# Patient Record
Sex: FEMALE | Race: WHITE | URBAN - METROPOLITAN AREA
[De-identification: names, ages, dates, MRNs, and addresses within clinical notes are randomized per-mention and may not be internally consistent; named-entity substitution may affect disease eponyms.]

---

## 2024-11-19 ENCOUNTER — HOSPITAL ENCOUNTER (EMERGENCY)
Facility: HOSPITAL | Age: 34
Discharge: HOME | End: 2024-11-19
Attending: EMERGENCY MEDICINE
Payer: COMMERCIAL

## 2024-11-19 ENCOUNTER — HOSPITAL ENCOUNTER (OUTPATIENT)
Facility: HOSPITAL | Age: 34
Discharge: ANOTHER HOSPITAL | End: 2024-11-19
Attending: OBSTETRICS & GYNECOLOGY | Admitting: OBSTETRICS & GYNECOLOGY
Payer: COMMERCIAL

## 2024-11-19 ENCOUNTER — HOSPITAL ENCOUNTER (EMERGENCY)
Facility: HOSPITAL | Age: 34
Discharge: STILL ADMITTED OR ADMINISTRATIVE | End: 2024-11-19
Payer: COMMERCIAL

## 2024-11-19 VITALS
SYSTOLIC BLOOD PRESSURE: 103 MMHG | HEART RATE: 102 BPM | BODY MASS INDEX: 26 KG/M2 | OXYGEN SATURATION: 98 % | WEIGHT: 149.94 LBS | DIASTOLIC BLOOD PRESSURE: 65 MMHG | RESPIRATION RATE: 20 BRPM | TEMPERATURE: 98 F

## 2024-11-19 VITALS
SYSTOLIC BLOOD PRESSURE: 104 MMHG | TEMPERATURE: 99 F | RESPIRATION RATE: 16 BRPM | DIASTOLIC BLOOD PRESSURE: 61 MMHG | HEART RATE: 105 BPM

## 2024-11-19 VITALS
TEMPERATURE: 97 F | WEIGHT: 151 LBS | SYSTOLIC BLOOD PRESSURE: 112 MMHG | OXYGEN SATURATION: 96 % | RESPIRATION RATE: 20 BRPM | HEART RATE: 113 BPM | HEIGHT: 63.78 IN | BODY MASS INDEX: 26.1 KG/M2 | DIASTOLIC BLOOD PRESSURE: 74 MMHG

## 2024-11-19 DIAGNOSIS — N30.00 ACUTE CYSTITIS WITHOUT HEMATURIA: ICD-10-CM

## 2024-11-19 DIAGNOSIS — R10.9 LEFT FLANK PAIN: Primary | ICD-10-CM

## 2024-11-19 LAB
ALBUMIN SERPL-MCNC: 4.3 G/DL (ref 3.2–4.8)
ALBUMIN/GLOB SERPL: 1.7 {RATIO} (ref 1–2)
ALP LIVER SERPL-CCNC: 90 U/L
ALT SERPL-CCNC: 13 U/L
ANION GAP SERPL CALC-SCNC: 9 MMOL/L (ref 0–18)
AST SERPL-CCNC: 18 U/L (ref ?–34)
BASOPHILS # BLD AUTO: 0.03 X10(3) UL (ref 0–0.2)
BASOPHILS NFR BLD AUTO: 0.3 %
BILIRUB SERPL-MCNC: 0.7 MG/DL (ref 0.3–1.2)
BILIRUB UR QL: NEGATIVE
BILIRUB UR QL: NEGATIVE
BUN BLD-MCNC: 6 MG/DL (ref 9–23)
BUN/CREAT SERPL: 12.2 (ref 10–20)
CALCIUM BLD-MCNC: 9.2 MG/DL (ref 8.7–10.4)
CHLORIDE SERPL-SCNC: 106 MMOL/L (ref 98–112)
CLARITY UR: CLEAR
CLARITY UR: CLEAR
CO2 SERPL-SCNC: 22 MMOL/L (ref 21–32)
COLOR UR: YELLOW
CREAT BLD-MCNC: 0.49 MG/DL
DEPRECATED RDW RBC AUTO: 40.9 FL (ref 35.1–46.3)
EGFRCR SERPLBLD CKD-EPI 2021: 128 ML/MIN/1.73M2 (ref 60–?)
EOSINOPHIL # BLD AUTO: 0.03 X10(3) UL (ref 0–0.7)
EOSINOPHIL NFR BLD AUTO: 0.3 %
ERYTHROCYTE [DISTWIDTH] IN BLOOD BY AUTOMATED COUNT: 13.3 % (ref 11–15)
GLOBULIN PLAS-MCNC: 2.6 G/DL (ref 2–3.5)
GLUCOSE BLD-MCNC: 92 MG/DL (ref 70–99)
GLUCOSE UR-MCNC: NORMAL MG/DL
GLUCOSE UR-MCNC: NORMAL MG/DL
HCT VFR BLD AUTO: 34.1 %
HGB BLD-MCNC: 12 G/DL
HGB UR QL STRIP.AUTO: NEGATIVE
HGB UR QL STRIP.AUTO: NEGATIVE
IMM GRANULOCYTES # BLD AUTO: 0.13 X10(3) UL (ref 0–1)
IMM GRANULOCYTES NFR BLD: 1.2 %
KETONES UR-MCNC: 10 MG/DL
KETONES UR-MCNC: 60 MG/DL
LEUKOCYTE ESTERASE UR QL STRIP.AUTO: 250
LEUKOCYTE ESTERASE UR QL STRIP.AUTO: 250
LYMPHOCYTES # BLD AUTO: 1.01 X10(3) UL (ref 1–4)
LYMPHOCYTES NFR BLD AUTO: 9.2 %
MCH RBC QN AUTO: 29.6 PG (ref 26–34)
MCHC RBC AUTO-ENTMCNC: 35.2 G/DL (ref 31–37)
MCV RBC AUTO: 84 FL
MONOCYTES # BLD AUTO: 0.48 X10(3) UL (ref 0.1–1)
MONOCYTES NFR BLD AUTO: 4.4 %
NEUTROPHILS # BLD AUTO: 9.32 X10 (3) UL (ref 1.5–7.7)
NEUTROPHILS # BLD AUTO: 9.32 X10(3) UL (ref 1.5–7.7)
NEUTROPHILS NFR BLD AUTO: 84.6 %
NITRITE UR QL STRIP.AUTO: NEGATIVE
NITRITE UR QL STRIP.AUTO: NEGATIVE
OSMOLALITY SERPL CALC.SUM OF ELEC: 281 MOSM/KG (ref 275–295)
PH UR: 6.5 [PH] (ref 5–8)
PH UR: 6.5 [PH] (ref 5–8)
PLATELET # BLD AUTO: 329 10(3)UL (ref 150–450)
POTASSIUM SERPL-SCNC: 4 MMOL/L (ref 3.5–5.1)
PROT SERPL-MCNC: 6.9 G/DL (ref 5.7–8.2)
PROT UR-MCNC: NEGATIVE MG/DL
RBC # BLD AUTO: 4.06 X10(6)UL
SODIUM SERPL-SCNC: 137 MMOL/L (ref 136–145)
SP GR UR STRIP: 1.01 (ref 1–1.03)
SP GR UR STRIP: 1.02 (ref 1–1.03)
UROBILINOGEN UR STRIP-ACNC: NORMAL
UROBILINOGEN UR STRIP-ACNC: NORMAL
WBC # BLD AUTO: 11 X10(3) UL (ref 4–11)

## 2024-11-19 PROCEDURE — 99284 EMERGENCY DEPT VISIT MOD MDM: CPT

## 2024-11-19 PROCEDURE — 87086 URINE CULTURE/COLONY COUNT: CPT | Performed by: OBSTETRICS & GYNECOLOGY

## 2024-11-19 PROCEDURE — 99285 EMERGENCY DEPT VISIT HI MDM: CPT

## 2024-11-19 PROCEDURE — 36415 COLL VENOUS BLD VENIPUNCTURE: CPT

## 2024-11-19 PROCEDURE — 87086 URINE CULTURE/COLONY COUNT: CPT | Performed by: EMERGENCY MEDICINE

## 2024-11-19 PROCEDURE — 99212 OFFICE O/P EST SF 10 MIN: CPT

## 2024-11-19 PROCEDURE — 85025 COMPLETE CBC W/AUTO DIFF WBC: CPT | Performed by: EMERGENCY MEDICINE

## 2024-11-19 PROCEDURE — 81001 URINALYSIS AUTO W/SCOPE: CPT | Performed by: EMERGENCY MEDICINE

## 2024-11-19 PROCEDURE — 81001 URINALYSIS AUTO W/SCOPE: CPT | Performed by: OBSTETRICS & GYNECOLOGY

## 2024-11-19 PROCEDURE — 80053 COMPREHEN METABOLIC PANEL: CPT | Performed by: EMERGENCY MEDICINE

## 2024-11-19 RX ORDER — CEPHALEXIN 500 MG/1
500 CAPSULE ORAL 4 TIMES DAILY
Qty: 28 CAPSULE | Refills: 0 | Status: SHIPPED | OUTPATIENT
Start: 2024-11-19 | End: 2024-11-26

## 2024-11-19 RX ORDER — CEPHALEXIN 500 MG/1
500 CAPSULE ORAL ONCE
Status: COMPLETED | OUTPATIENT
Start: 2024-11-19 | End: 2024-11-19

## 2024-11-19 RX ORDER — MULTIVIT,IRON,MINERALS/LUTEIN
TABLET ORAL
COMMUNITY

## 2024-11-19 NOTE — ED INITIAL ASSESSMENT (HPI)
Patient arrives to ER for evaluation of Left flank pain that radiates around to stomach. Patient endorses vomiting yesterday. Denies any fevers, or chills. Patient was told she has a kidney stone a week or so ago.       27weeks pregnant. Already cleared by [facility].

## 2024-11-19 NOTE — TRIAGE
[facility]  part of [facility]      Triage Note    Nelly Li Patient Status:  Outpatient    1990 MRN [id number]   Location [facility] BIRTH CENTER Attending Stephanie Chandler MD   Hosp Day # 0 PCP No primary care provider on file.      Para:   Estimated Date of Delivery: 25  Gestation: 27w2d    Chief Complaint     Complication         Allergies:  Allergies[1]    Orders Placed This Encounter   Procedures    Urinalysis with Culture Reflex    Urine Culture, Routine       No results found for: \"WBC\", \"HGB\", \"HCT\", \"PLT\", \"CREATSERUM\", \"BUN\", \"NA\", \"K\", \"CL\", \"CO2\", \"GLU\", \"CA\", \"ALB\", \"ALKPHO\", \"BILT\", \"TP\", \"AST\", \"ALT\", \"PTT\", \"INR\", \"PT\", \"T4F\", \"TSH\", \"TSHREFLEX\", \"PORSCHE\", \"LIP\", \"GGT\", \"PSA\", \"DDIMER\", \"ESRML\", \"ESRPF\", \"CRP\", \"BNP\", \"MG\", \"PHOS\", \"TROP\", \"TROPHS\", \"CK\", \"CKMB\", \"JALEN\", \"RPR\", \"B12\", \"ETOH\", \"POCGLU\", \"FFN\"    Clinitek UA  Lab Results   Component Value Date    GLUUR Normal 2024    SPECGRAVITY 1.014 2024       UA  Lab Results   Component Value Date    COLORUR Light-Yellow 2024    CLARITY Clear 2024    SPECGRAVITY 1.014 2024    PROUR Negative 2024    GLUUR Normal 2024    KETUR 10 (A) 2024    BILUR Negative 2024    BLOODURINE Negative 2024    NITRITE Negative 2024    UROBILINOGEN Normal 2024    LEUUR 250 (A) 2024       Vitals:    24 1530 24 1532   BP: 104/61    BP Location: Right arm    Pulse: 105    Resp: 16    Temp:  98.8 °F (37.1 °C)   TempSrc:  Oral       NST:  appropriate for gestational age  Variability: Moderate           Accelerations: Yes           Decelerations: None            Baseline: 140 BPM           Uterine Irritability: No           Contractions: Not present                                        Acoustic Stimulator: No           Nonstress Test Interpretation: Appropriate for gestational age           Nonstress Test Second  Interpretation: Appropriate for gestational age          FHR Category: Category I           Chief Complaint   Patient presents with     Complication     Pt. States she has LLQ pain, and left flank pain since last night.  Reports +FM.  Denies leaking fluid. Patient diagnosed with Kidney Stones in 2024. Patient states that she passed a kidney stone, but has another kidney stone that has not passed.         @ 27 2/7 wks. here with c/o LLQ pain, and Left flank pain.  pt. reports having been dx. with kidney stones in 3/2024.  states passed one stone, but has one that has not passed.  NST appropriate for gestational age.  order received from Dr. Gonsalez to discharge pt. to ED for further workup.     Pt. Taken to ED triage for further evaluation.  Pt. Taken via wheelchair, and accompanied by staff.        Abbey PERALES RN  2024 4:50 PM         [1] No Known Allergies

## 2024-11-19 NOTE — ED INITIAL ASSESSMENT (HPI)
Pt c/o Left sided flank pain that radiates to LLQ. Pt reports 27 weeks pregnant. Pt reports feeling contractions. Pt unknown how far a part contractions are. Pt denies vaginal bleeding or dc. Pt c/o pelvic pressure.

## 2024-11-19 NOTE — H&P
[facility]  part of [facility]    History & Physical    Nelly Li Patient Status:  Outpatient    1990 MRN [id number]   Location [facility] CENTER Attending Stephanie Chandler MD   Hosp Day # 0 PCP No primary care provider on file.     Date of Triage:  2024      HPI:   Nelly Li is a 34 year old  female, current EGA of 27w2d with an estimated date of delivery of: 2025, Date entered prior to episode creation who presents due to left flank pain.      She presented to the ED with complaints of left flank pain that radiates to the lower LLQ.  She denies contractions, leakage of fluid, VB, fever or chills.  She reports she had an episode of emesis yesterday but nothing since.      Pt denies N/V/F/C/CP/SOB, HA, blurry vision, dizziness, RUQ pain, ctx, lof, VB at current time.    Pt reports she has a h/o kidney stone.  She stated she was told she had has a kidney stone about 1 month ago with Dr. Gonzales (10/14/2024), urology. She believe she passed a kidney stone back in march.         She went to the ED but was sent up to rule out labor.  Pt currently not in labor.      Her current obstetrical history is significant for  h/o nephrolithiasis .  There is no problem list on file for this patient.        Fetal Movement reported as good.  GBS unknown.   Rh positive.    History   Obstetric History:   OB History    Para Term  AB Living   2 1 1     1   SAB IAB Ectopic Multiple Live Births           1      # Outcome Date GA Lbr Daniel/2nd Weight Sex Type Anes PTL Lv   2 Current            1 Term 23     NORMAL SPONT   MAC       Gyne History:   Last pap smear: 2021: Negative cytology and HR-HPV not detected    Past Medical History:   Past Medical History:    Kidney stones       Past Surgerical History: No past surgical history on file.    Social History:   Social History     Tobacco Use    Smoking status: Never    Smokeless tobacco:  Never   Substance Use Topics    Alcohol use: Never        Allergies/Medications:   Allergies:   Allergies[1]    Medications:  Prescriptions Prior to Admission[2]      Review of Systems:   As documented in HPI      Physical Exam:   Temp:  [97.1 °F (36.2 °C)-98.8 °F (37.1 °C)] 98.3 °F (36.8 °C)  Pulse:  [102-113] 102  Resp:  [16-20] 20  BP: (103-112)/(61-74) 103/65  SpO2:  [96 %-98 %] 98 %    Constitutional: alert and cooperative in NAD     Abdomen: soft,  nontender, gravid    Vaginal exam: No performed since no sx or si of labor    FHT assessment:   Baseline: 135 bpm   Variability: moderate   Accels:  present   Decels: No   [place]os:  None    Neurologic: Alert and oriented  Psychiatric: Cooperative    Results:     Recent Results (from the past 24 hours)   Urinalysis with Culture Reflex    Collection Time: 24  3:32 PM    Specimen: Urine, clean catch   Result Value Ref Range    Urine Color Light-Yellow Yellow    Clarity Urine Clear Clear    Spec Gravity 1.014 1.005 - 1.030    Glucose Urine Normal Normal mg/dL    Bilirubin Urine Negative Negative    Ketones Urine 10 (A) Negative mg/dL    Blood Urine Negative Negative    pH Urine 6.5 5.0 - 8.0    Protein Urine Negative Negative mg/dL    Urobilinogen Urine Normal Normal    Nitrite Urine Negative Negative    Leukocyte Esterase Urine 250 (A) Negative    WBC Urine 6-10 (A) 0 - 5 /HPF    RBC Urine 0-2 0 - 2 /HPF    Bacteria Urine 1+ (A) None Seen /HPF    Squamous Epi. Cells Few (A) None Seen /HPF    Renal Tubular Epithelial Cells None Seen None Seen /HPF    Transitional Cells None Seen None Seen /HPF    Yeast Urine None Seen None Seen /HPF       No results found.        Assessment/Plan:   IUP 27w2d  presents with left flank pain.    Obstetrical history significant for  h/o nephrolithasis . There is no problem list on file for this patient.        Treatment Plan:  Not in labor, discharge home.    Nelly Li is a 34 year old  female, current EGA of 27w2d who  presents to triage from ED    Risks, benefits, alternatives and possible complications have been discussed in detail with the patient.   All questions answered; all appropriate consents will be signed at the Hospital.    IUP at 27w2d  Fetal heart tones appropriate for GA  No evidence of Labor: no contractions on the monitor and pt denies contractions  GBS unknown  Left flank pain: likely kidney stone.  Pt went to ED but was told ED wanted us to rule out labor.  Pt denies contractions and there were no contractions on the monitor.  Will send to ED for evaluation of flank pain, likely will need kidney US for reevaluation.  She was last seen by urologist is Dr. Marcelino Pyle.  He suggested repeat renal US if pain persists or worsens.  Discussed labor precautions and kick counts.  Pt scheduled to see OB on 11/26/2024.          Stephanie Chandler MD  11/19/2024  4:45 PM          [1] No Known Allergies  [2]   Medications Prior to Admission   Medication Sig Dispense Refill Last Dose/Taking    Prenatal Vit-Fe Fumarate-FA (MULTI PRENATAL) 27-0.8 MG Oral Tab Take by mouth.   11/18/2024

## 2024-11-19 NOTE — PROGRESS NOTES
Pt is a 34 year old female admitted to TR2/TR2-A.     Chief Complaint   Patient presents with     Complication     Pt. States she has LLQ pain, and left flank pain since last night.  Reports +FM.  Denies leaking fluid.       Pt is  27w2d intra-uterine pregnancy.  History obtained, pt. Oriented to room, staff, and plan of care.

## 2024-11-20 NOTE — DISCHARGE INSTRUCTIONS
Call the urologist for follow-up.   Keep your scheduled appointment with your OBGYN    Return to the ER immediately if you have fever, significant nausea and vomiting, or increasing pain not responsive to tylenol.

## 2024-11-20 NOTE — ED PROVIDER NOTES
Patient Seen in: [facility] Emergency Department      History     Chief Complaint   Patient presents with    Abdomen/Flank Pain     Stated Complaint: abd flank pain    Subjective:   HPI          Objective:     Past Medical History:    Kidney stones              History reviewed. No pertinent surgical history.             Social History     Socioeconomic History    Marital status:    Tobacco Use    Smoking status: Never    Smokeless tobacco: Never   Vaping Use    Vaping status: Never Used   Substance and Sexual Activity    Alcohol use: Never    Drug use: Never     Social Drivers of Health     Financial Resource Strain: Low Risk  (11/19/2024)    Financial Resource Strain     Difficulty of Paying Living Expenses: Not very hard     Med Affordability: No   Food Insecurity: No Food Insecurity (11/19/2024)    Food Insecurity     Food Insecurity: Never true   Transportation Needs: No Transportation Needs (11/19/2024)    Transportation Needs     Lack of Transportation: No     Car Seat: Yes   Stress: No Stress Concern Present (11/19/2024)    Stress     Feeling of Stress : No   Housing Stability: Low Risk  (11/19/2024)    Housing Stability     Housing Instability: No     Crib or [place]: Yes                  Physical Exam     ED Triage Vitals [11/19/24 1631]   /65   Pulse 102   Resp 20   Temp 98.3 °F (36.8 °C)   Temp src Temporal   SpO2 98 %   O2 Device None (Room air)       Current Vitals:   Vital Signs  BP: 103/65  Pulse: 102  Resp: 20  Temp: 98.3 °F (36.8 °C)  Temp src: Temporal    Oxygen Therapy  SpO2: 98 %  O2 Device: None (Room air)        Physical Exam        ED Course     Labs Reviewed   CBC WITH DIFFERENTIAL WITH PLATELET - Abnormal; Notable for the following components:       Result Value    HCT 34.1 (*)     Neutrophil Absolute Prelim 9.32 (*)     Neutrophil Absolute 9.32 (*)     All other components within normal limits   COMP METABOLIC PANEL (14) - Abnormal; Notable for the following  components:    BUN 6 (*)     Creatinine 0.49 (*)     All other components within normal limits   URINALYSIS WITH CULTURE REFLEX - Abnormal; Notable for the following components:    Ketones Urine 60 (*)     Protein Urine Trace (*)     Leukocyte Esterase Urine 250 (*)     WBC Urine 6-10 (*)     Bacteria Urine Rare (*)     Squamous Epi. Cells Few (*)     All other components within normal limits                   MDM      34-year-old female with a history of kidney stone passed last spring and currently 27 weeks pregnant and otherwise uncomplicated pregnancy presents today with some left flank pain.  Patient states symptoms started yesterday and had some associated nausea and vomiting at that time.  Symptoms feel similar to her previous kidney stone.  Denies fevers, dysuria, vaginal bleeding, or other symptoms.  She has not taken any medications for the above.    On exam, vitals reassuring, well-appearing, no CVA tenderness, appropriate gravid abdomen, no significant abdominal tenderness    Differential: Renal colic, ureterolithiasis, pyelonephritis, round ligament pain    I performed interpreted bedside renal ultrasound which showed no evidence of hydronephrosis bilaterally.  There is a visible intramedullary kidney stone on the left.  Bladder normal.    Labs performed were reassuring.  Urinalysis showing evidence of possible infection with some mild pyuria and leukocyte esterase.  Overall, concern for infected kidney stone and pyelonephritis is relatively low.  I will start the patient on cephalexin and recommend close follow-up with urology and OB/GYN with careful return precautions.        MDM    Disposition and Plan     Clinical Impression:  1. Left flank pain    2. Acute cystitis without hematuria         Disposition:  Discharge  11/19/2024  6:21 pm    Follow-up:  Ariana Geronimo MD  [address] [address]  [phone]    Call            Medications Prescribed:  Discharge Medication List  as of 11/19/2024  6:25 PM        START taking these medications    Details   cephALEXin 500 MG Oral Cap Take 1 capsule (500 mg total) by mouth 4 (four) times daily for 7 days., Normal, Disp-28 capsule, R-0                 Supplementary Documentation:

## 2025-01-13 ENCOUNTER — HOSPITAL ENCOUNTER (OUTPATIENT)
Facility: HOSPITAL | Age: 35
Discharge: HOME | End: 2025-01-13
Attending: OBSTETRICS & GYNECOLOGY | Admitting: OBSTETRICS & GYNECOLOGY
Payer: COMMERCIAL

## 2025-01-13 VITALS — SYSTOLIC BLOOD PRESSURE: 131 MMHG | DIASTOLIC BLOOD PRESSURE: 88 MMHG | HEART RATE: 102 BPM

## 2025-01-13 LAB — RUPTURE OF MEMBRANE (ROM): NEGATIVE

## 2025-01-13 PROCEDURE — 59025 FETAL NON-STRESS TEST: CPT

## 2025-01-13 PROCEDURE — 84112 EVAL AMNIOTIC FLUID PROTEIN: CPT | Performed by: OBSTETRICS & GYNECOLOGY

## 2025-01-13 PROCEDURE — 96360 HYDRATION IV INFUSION INIT: CPT

## 2025-01-13 PROCEDURE — 96361 HYDRATE IV INFUSION ADD-ON: CPT

## 2025-01-13 PROCEDURE — 99214 OFFICE O/P EST MOD 30 MIN: CPT

## 2025-01-13 RX ORDER — ONDANSETRON 2 MG/ML
4 INJECTION INTRAMUSCULAR; INTRAVENOUS EVERY 6 HOURS PRN
Status: DISCONTINUED | OUTPATIENT
Start: 2025-01-13 | End: 2025-01-13

## 2025-01-13 NOTE — PROGRESS NOTES
Pt is a 34 year old female admitted to TR2/TR2-A.     Chief Complaint   Patient presents with    R/o Rom     Pt reports leaking of fluid since 10 this morning, states she soaked 4 pads throughout the day. Pt reports irregular contractions.       Pt is  35w1d intra-uterine pregnancy.  History obtained, consents signed. Oriented to room, staff, and plan of care.

## 2025-01-14 ENCOUNTER — HOSPITAL ENCOUNTER (OUTPATIENT)
Facility: HOSPITAL | Age: 35
Discharge: HOME | End: 2025-01-14
Attending: OBSTETRICS & GYNECOLOGY | Admitting: OBSTETRICS & GYNECOLOGY
Payer: COMMERCIAL

## 2025-01-14 VITALS — DIASTOLIC BLOOD PRESSURE: 71 MMHG | HEART RATE: 97 BPM | SYSTOLIC BLOOD PRESSURE: 114 MMHG

## 2025-01-14 PROCEDURE — 99213 OFFICE O/P EST LOW 20 MIN: CPT

## 2025-01-14 PROCEDURE — 59025 FETAL NON-STRESS TEST: CPT

## 2025-01-14 RX ORDER — FERROUS SULFATE 325(65) MG
325 TABLET, DELAYED RELEASE (ENTERIC COATED) ORAL
COMMUNITY

## 2025-01-14 NOTE — H&P
[facility]  part of [facility]    History & Physical    Nelly Li Patient Status:  Outpatient    1990 MRN [id number]   Location [facility] BIRTH CENTER Attending Keila Uribe MD   Hosp Day # 0 PCP No primary care provider on file.     Date of Admission:  2025      HPI:   Nelly Li is a 34 year old  female, current EGA of 35w1d with an estimated date of delivery of: 2025, Date entered prior to episode creation who presents due to leaking fluid and uncomfortable contractions over the last 3 days. Feels like cramping. Reports has had 4 wet pads today.   She denies vaginal bleeding, leaking fluid.       Fetal Movement reported as good.  GBS unknown.   Rh positive.    History   Obstetric History:   OB History    Para Term  AB Living   2 1 1     1   SAB IAB Ectopic Multiple Live Births           1      # Outcome Date GA Lbr Daniel/2nd Weight Sex Type Anes PTL Lv   2 Current            1 Term 23     NORMAL SPONT   MAC       Past Medical History:   Past Medical History:    Kidney stones       Past Surgerical History: History reviewed. No pertinent surgical history.    Social History:   Social History     Tobacco Use    Smoking status: Never    Smokeless tobacco: Never   Substance Use Topics    Alcohol use: Never        Allergies/Medications:   Allergies:   Allergies[1]    Medications:  Prescriptions Prior to Admission[2]      Review of Systems:   As documented in HPI      Physical Exam:   Pulse:  [102] 102  BP: (131)/(88) 131/88    Constitutional: alert and cooperative in No distress    Abdomen: soft,  nontender, gravid    Vaginal exam: 2-3/50/-3  FHT assessment:   140, reactive   [place]: q3-5 min   Neurologic: Alert and oriented  Psychiatric: Cooperative    Results:     Recent Results (from the past 24 hours)   Rupture of Membrane (ROM),Protein Markers    Collection Time: 25  3:33 PM   Result Value Ref Range    Rupture of  Membrane (ROM), Protein Markers Negative Negative       No results found.        Assessment/Plan:     Nelly Li is a 34 year old  female, current EGA of 35w1d who presents for leakage of fluid and  contractions.     Patient has been seen in triage over the last few hours.  Initial speculum exam showed negative ferning and free flow. Amnisure was sent given patient's story and it was negative.   Given patient's contractions, cervix was then checked and was 2-3/50/-3. Patient was given IV fluids. Exam was repeated in 2-3 hours and unchanged per same examiner, triage RN.   Patient reports contractions are the same, she feels them in her back. She states \"they are painful, but I don't want pain medications.\"    I discussed  contractions versus  labor. We discussed the risk of  labor and delivery, possibility of undiagnosed rupture of membranes and infection. Discussed I do not know if she has been 2cm at baseline or if the contractions over the last 3 days have changed her cervix.     I told her that I strongly recommend that she stays overnight for monitoring, possible serial cervical exams and possible consideration of steroids and tocolytics depending on her clinical course. Patient was very emotional and sobbing, stating she did not want to leave her baby (18 months) overnight. I reiterated that I was concerned for the well being of her fetus and herself and I recommend that she stay. I asked her to discuss it with her  and told her I cannot force her to stay at the hospital but I am concerned and this is what I am recommending.    After discussion with her , the patient has decided that she wants to go home immediately. She does not want to finish her IV fluid bag. RN and I discussed the case and plan to document refusal of care as above. Patient discharged.      Keila Uribe MD  2025  8:02 PM       [1] No Known Allergies  [2]   Medications Prior to  Admission   Medication Sig Dispense Refill Last Dose/Taking    Prenatal Vit-Fe Fumarate-FA (MULTI PRENATAL) 27-0.8 MG Oral Tab Take by mouth.   2025    [] cephALEXin 500 MG Oral Cap Take 1 capsule (500 mg total) by mouth 4 (four) times daily for 7 days. 28 capsule 0

## 2025-01-14 NOTE — PROGRESS NOTES
Pt is a 34 year old female admitted to TR2/TR2-A.     Chief Complaint   Patient presents with    Non-stress Test     Pt was seen in triage yesterday for r/o rupture/ labor. She was found to be 2.5 cm. MD wanted her to stay overnight for observation, pt refused. Pt states that she was instructed to come back today to have her cervix reexamined.      Pt is  35w2d intra-uterine pregnancy.  History obtained, consents signed. Oriented to room, staff, and plan of care.

## 2025-01-14 NOTE — PROGRESS NOTES
Patient refusing to be admitted for observation overnight. Dr. Uribe notified and discharge order received.

## 2025-01-14 NOTE — PROGRESS NOTES
Dr. Uribe at bedside discussing POC with patient. Benefits of observation explained and risks of discharge. Patient would like some time to discuss it with her spouse over the phone privately.

## 2025-01-14 NOTE — TRIAGE
[facility]  part of [facility]      Triage Note    Nelly Li Patient Status:  Outpatient    1990 MRN [id number]   Location [facility] BIRTH CENTER Attending Keila Uribe MD   Hosp Day # 0 PCP No primary care provider on file.      Para:   Estimated Date of Delivery: 25  Gestation: 35w1d    Chief Complaint    R/o Rom         Allergies:  Allergies[1]    Orders Placed This Encounter   Procedures    Rupture of Membrane (ROM),Protein Markers    POCT Ferning       Lab Results   Component Value Date    WBC 11.0 2024    HGB 12.0 2024    HCT 34.1 (L) 2024    .0 2024    CREATSERUM 0.49 (L) 2024    BUN 6 (L) 2024     2024    K 4.0 2024     2024    CO2 22.0 2024    GLU 92 2024    CA 9.2 2024    ALB 4.3 2024    ALKPHO 90 2024    BILT 0.7 2024    TP 6.9 2024    AST 18 2024    ALT 13 2024       Clinitek UA  Lab Results   Component Value Date    GLUUR Normal 2024    SPECGRAVITY 1.018 2024    URINECUL No Growth at 18-24 hrs. 2024       UA  Lab Results   Component Value Date    COLORUR Yellow 2024    CLARITY Clear 2024    SPECGRAVITY 1.018 2024    PROUR Trace (A) 2024    GLUUR Normal 2024    KETUR 60 (A) 2024    BILUR Negative 2024    BLOODURINE Negative 2024    NITRITE Negative 2024    UROBILINOGEN Normal 2024    LEUUR 250 (A) 2024       Vitals:    25 1430   BP: 131/88   Pulse: 102       NST  Variability: Moderate           Accelerations: Yes           Decelerations: None            Baseline: 130 BPM           Uterine Irritability: No           Contractions: Regular                    Contraction Frequency: 2-4                   Acoustic Stimulator: No           Nonstress Test Interpretation: Reactive           Nonstress Test Second  Interpretation: Reactive                        Additional Comments   Patient presents to triage for rule out  labor and possible ROM. Patient intact. Negative ferning. Patient davi regularly. IV bolus given, SVE 2/50/-3. Patient rechecked a few hours later and SVE remained unchanged. Dr. Uribe at bedside to assess patient and discuss POC for admission for observation. Patient refused admission for observation. Dr. Uribe notified and discharge orders received. Written and verbal  labor precautions and kick counts given to patient. Patient left in stable condition.     Chief Complaint   Patient presents with    R/o Rom     Pt reports leaking of fluid since 10 this morning, states she soaked 4 pads throughout the day. Pt reports irregular contractions.          Sharmaine GUZMAN RN  2025 9:09 PM         [1] No Known Allergies

## 2025-01-14 NOTE — TRIAGE
OK to return to work with no driving restrictions  IF any events occur concerning for seizure/loss of consciousness, call clinic and no driving.  Continue keppra  Trial of tizanidine as needed for facial spasm  Return to clinic 6-12 weeks, may consider botox at that time if spasm is bothering you.      Humberto Fine MD    [facility]   [facility] [place] [address]  Phone Number: [phone]  Fax Number: [phone]     [facility]  part of [facility]      Triage Note    Nelly Li Patient Status:  Outpatient    1990 MRN [id number]   Location [facility] CENTER Attending Kathie Orosco MD   Hosp Day # 0 PCP No primary care provider on file.      Para:   Estimated Date of Delivery: 25  Gestation: 35w2d    Chief Complaint    Non-stress Test         Allergies:  Allergies[1]    No orders of the defined types were placed in this encounter.      Lab Results   Component Value Date    WBC 11.0 2024    HGB 12.0 2024    HCT 34.1 (L) 2024    .0 2024    CREATSERUM 0.49 (L) 2024    BUN 6 (L) 2024     2024    K 4.0 2024     2024    CO2 22.0 2024    GLU 92 2024    CA 9.2 2024    ALB 4.3 2024    ALKPHO 90 2024    BILT 0.7 2024    TP 6.9 2024    AST 18 2024    ALT 13 2024       Clinitek UA  Lab Results   Component Value Date    GLUUR Normal 2024    SPECGRAVITY 1.018 2024    URINECUL No Growth at 18-24 hrs. 2024       UA  Lab Results   Component Value Date    COLORUR Yellow 2024    CLARITY Clear 2024    SPECGRAVITY 1.018 2024    PROUR Trace (A) 2024    GLUUR Normal 2024    KETUR 60 (A) 2024    BILUR Negative 2024    BLOODURINE Negative 2024    NITRITE Negative 2024    UROBILINOGEN Normal 2024    LEUUR 250 (A) 2024       Vitals:    25 1150   BP: 114/71   Pulse: 97       NST  Variability: Moderate           Accelerations: Yes           Decelerations: None            Baseline: 135 BPM           Uterine Irritability: No           Contractions: Irregular                                        Acoustic Stimulator: No           Nonstress Test Interpretation: Reactive           Nonstress Test Second Interpretation: Reactive          FHR Category: Category  I             Additional Comments Comments: Dr. Orosco notified of reactive NST, +FM and no cervical change. Orders for discharge recieved. Reviewed kick counts, PTL precautions with pt. Pt denies questions at this time. Home amb accompanied by SO.     Chief Complaint   Patient presents with    Non-stress Test     Pt was seen in triage yesterday for r/o rupture/ labor. She was found to be 2.5 cm. MD wanted her to stay overnight for observation, pt refused. Pt states that she was instructed to come back today to have her cervix reexamined.         Corinne S, RN  2025 12:46 PM         [1] No Known Allergies

## 2025-01-27 ENCOUNTER — HOSPITAL ENCOUNTER (OUTPATIENT)
Facility: HOSPITAL | Age: 35
Discharge: HOME | End: 2025-01-27
Attending: STUDENT IN AN ORGANIZED HEALTH CARE EDUCATION/TRAINING PROGRAM | Admitting: STUDENT IN AN ORGANIZED HEALTH CARE EDUCATION/TRAINING PROGRAM
Payer: COMMERCIAL

## 2025-01-27 VITALS
TEMPERATURE: 98 F | RESPIRATION RATE: 18 BRPM | HEART RATE: 82 BPM | DIASTOLIC BLOOD PRESSURE: 79 MMHG | SYSTOLIC BLOOD PRESSURE: 122 MMHG

## 2025-01-27 PROCEDURE — 59025 FETAL NON-STRESS TEST: CPT

## 2025-01-27 PROCEDURE — 96360 HYDRATION IV INFUSION INIT: CPT

## 2025-01-27 PROCEDURE — 99213 OFFICE O/P EST LOW 20 MIN: CPT

## 2025-01-27 NOTE — TRIAGE
[facility]  part of [facility]      Triage Note    Nelly Li Patient Status:  Outpatient    1990 MRN [id number]   Location [facility] Attending Kary Michelle DO   Hosp Day # 0 PCP No primary care provider on file.      Para:   Estimated Date of Delivery: 25  Gestation: 37w1d    Chief Complaint    R/o Labor         Allergies:  Allergies[1]    No orders of the defined types were placed in this encounter.      Lab Results   Component Value Date    WBC 11.0 2024    HGB 12.0 2024    HCT 34.1 (L) 2024    .0 2024    CREATSERUM 0.49 (L) 2024    BUN 6 (L) 2024     2024    K 4.0 2024     2024    CO2 22.0 2024    GLU 92 2024    CA 9.2 2024    ALB 4.3 2024    ALKPHO 90 2024    BILT 0.7 2024    TP 6.9 2024    AST 18 2024    ALT 13 2024       Clinitek UA  Lab Results   Component Value Date    GLUUR Normal 2024    SPECGRAVITY 1.018 2024    URINECUL No Growth at 18-24 hrs. 2024       UA  Lab Results   Component Value Date    COLORUR Yellow 2024    CLARITY Clear 2024    SPECGRAVITY 1.018 2024    PROUR Trace (A) 2024    GLUUR Normal 2024    KETUR 60 (A) 2024    BILUR Negative 2024    BLOODURINE Negative 2024    NITRITE Negative 2024    UROBILINOGEN Normal 2024    LEUUR 250 (A) 2024       Vitals:    25 0300   BP: 122/79   BP Location: Left arm   Pulse: 82   Resp: 18   Temp: 98.3 °F (36.8 °C)   TempSrc: Oral       NST  Variability: Moderate           Accelerations: Yes           Decelerations: None            Baseline: 135 BPM           Uterine Irritability: No           Contractions: Regular                    Contraction Frequency: 2-5                   Acoustic Stimulator: No           Nonstress Test Interpretation: Reactive                                     Additional Comments       Chief Complaint   Patient presents with    R/o Labor     CTX      EFM tracing reactive with regular contractions every 2-5 minutes. IV fluid bolus given. Initial SVE 3/60/-2. SVE after 5 hours 2.5/60/-3 per Dr Zaman. Discharge order received. Kick count and labor precautions reviewed with pt. Pt states understanding. Discharged to home.    Bre LOGAN RN  1/27/2025 8:19 AM         [1] No Known Allergies

## 2025-02-07 ENCOUNTER — ANESTHESIA EVENT (OUTPATIENT)
Dept: OBGYN UNIT | Facility: HOSPITAL | Age: 35
End: 2025-02-07
Payer: COMMERCIAL

## 2025-02-07 ENCOUNTER — HOSPITAL ENCOUNTER (INPATIENT)
Facility: HOSPITAL | Age: 35
LOS: 2 days | Discharge: HOME | End: 2025-02-09
Attending: OBSTETRICS & GYNECOLOGY | Admitting: OBSTETRICS & GYNECOLOGY
Payer: COMMERCIAL

## 2025-02-07 ENCOUNTER — ANESTHESIA (OUTPATIENT)
Dept: OBGYN UNIT | Facility: HOSPITAL | Age: 35
End: 2025-02-07
Payer: COMMERCIAL

## 2025-02-07 PROBLEM — Z34.90 PREGNANCY (HCC): Status: ACTIVE | Noted: 2025-02-07

## 2025-02-07 LAB
ANTIBODY SCREEN: NEGATIVE
BASOPHILS # BLD AUTO: 0.07 X10(3) UL (ref 0–0.2)
BASOPHILS NFR BLD AUTO: 0.5 %
DEPRECATED RDW RBC AUTO: 43.8 FL (ref 35.1–46.3)
EOSINOPHIL # BLD AUTO: 0.02 X10(3) UL (ref 0–0.7)
EOSINOPHIL NFR BLD AUTO: 0.1 %
ERYTHROCYTE [DISTWIDTH] IN BLOOD BY AUTOMATED COUNT: 14.4 % (ref 11–15)
HCT VFR BLD AUTO: 40.3 %
HGB BLD-MCNC: 13.3 G/DL
IMM GRANULOCYTES # BLD AUTO: 0.19 X10(3) UL (ref 0–1)
IMM GRANULOCYTES NFR BLD: 1.4 %
LYMPHOCYTES # BLD AUTO: 2.96 X10(3) UL (ref 1–4)
LYMPHOCYTES NFR BLD AUTO: 21.8 %
MCH RBC QN AUTO: 27.8 PG (ref 26–34)
MCHC RBC AUTO-ENTMCNC: 33 G/DL (ref 31–37)
MCV RBC AUTO: 84.3 FL
MONOCYTES # BLD AUTO: 0.74 X10(3) UL (ref 0.1–1)
MONOCYTES NFR BLD AUTO: 5.4 %
NEUTROPHILS # BLD AUTO: 9.6 X10 (3) UL (ref 1.5–7.7)
NEUTROPHILS # BLD AUTO: 9.6 X10(3) UL (ref 1.5–7.7)
NEUTROPHILS NFR BLD AUTO: 70.8 %
PLATELET # BLD AUTO: 553 10(3)UL (ref 150–450)
RBC # BLD AUTO: 4.78 X10(6)UL
RH BLOOD TYPE: POSITIVE
RH BLOOD TYPE: POSITIVE
T PALLIDUM AB SER QL IA: NONREACTIVE
WBC # BLD AUTO: 13.6 X10(3) UL (ref 4–11)

## 2025-02-07 PROCEDURE — 0HQ9XZZ REPAIR PERINEUM SKIN, EXTERNAL APPROACH: ICD-10-PCS | Performed by: OBSTETRICS & GYNECOLOGY

## 2025-02-07 PROCEDURE — 99214 OFFICE O/P EST MOD 30 MIN: CPT

## 2025-02-07 PROCEDURE — 86901 BLOOD TYPING SEROLOGIC RH(D): CPT | Performed by: OBSTETRICS & GYNECOLOGY

## 2025-02-07 PROCEDURE — 85025 COMPLETE CBC W/AUTO DIFF WBC: CPT | Performed by: OBSTETRICS & GYNECOLOGY

## 2025-02-07 PROCEDURE — 86780 TREPONEMA PALLIDUM: CPT | Performed by: OBSTETRICS & GYNECOLOGY

## 2025-02-07 PROCEDURE — 86850 RBC ANTIBODY SCREEN: CPT | Performed by: OBSTETRICS & GYNECOLOGY

## 2025-02-07 PROCEDURE — 86900 BLOOD TYPING SEROLOGIC ABO: CPT | Performed by: OBSTETRICS & GYNECOLOGY

## 2025-02-07 RX ORDER — LIDOCAINE HYDROCHLORIDE 10 MG/ML
INJECTION, SOLUTION EPIDURAL; INFILTRATION; INTRACAUDAL; PERINEURAL AS NEEDED
Status: DISCONTINUED | OUTPATIENT
Start: 2025-02-07 | End: 2025-02-07 | Stop reason: SURG

## 2025-02-07 RX ORDER — BUPIVACAINE HCL/0.9 % NACL/PF 0.25 %
5 PLASTIC BAG, INJECTION (ML) EPIDURAL AS NEEDED
Status: DISCONTINUED | OUTPATIENT
Start: 2025-02-07 | End: 2025-02-07

## 2025-02-07 RX ORDER — ACETAMINOPHEN 500 MG
500 TABLET ORAL EVERY 6 HOURS PRN
Status: DISCONTINUED | OUTPATIENT
Start: 2025-02-07 | End: 2025-02-07 | Stop reason: HOSPADM

## 2025-02-07 RX ORDER — NALBUPHINE HYDROCHLORIDE 10 MG/ML
2.5 INJECTION INTRAMUSCULAR; INTRAVENOUS; SUBCUTANEOUS
Status: DISCONTINUED | OUTPATIENT
Start: 2025-02-07 | End: 2025-02-07

## 2025-02-07 RX ORDER — LIDOCAINE HYDROCHLORIDE AND EPINEPHRINE 15; 5 MG/ML; UG/ML
INJECTION, SOLUTION EPIDURAL AS NEEDED
Status: DISCONTINUED | OUTPATIENT
Start: 2025-02-07 | End: 2025-02-07 | Stop reason: SURG

## 2025-02-07 RX ORDER — DEXTROSE, SODIUM CHLORIDE, SODIUM LACTATE, POTASSIUM CHLORIDE, AND CALCIUM CHLORIDE 5; .6; .31; .03; .02 G/100ML; G/100ML; G/100ML; G/100ML; G/100ML
INJECTION, SOLUTION INTRAVENOUS CONTINUOUS
Status: DISCONTINUED | OUTPATIENT
Start: 2025-02-07 | End: 2025-02-07 | Stop reason: HOSPADM

## 2025-02-07 RX ORDER — BUPIVACAINE HYDROCHLORIDE 2.5 MG/ML
20 INJECTION, SOLUTION EPIDURAL; INFILTRATION; INTRACAUDAL ONCE
Status: DISCONTINUED | OUTPATIENT
Start: 2025-02-07 | End: 2025-02-07 | Stop reason: HOSPADM

## 2025-02-07 RX ORDER — CITRIC ACID/SODIUM CITRATE 334-500MG
30 SOLUTION, ORAL ORAL AS NEEDED
Status: DISCONTINUED | OUTPATIENT
Start: 2025-02-07 | End: 2025-02-07 | Stop reason: HOSPADM

## 2025-02-07 RX ORDER — TERBUTALINE SULFATE 1 MG/ML
0.25 INJECTION SUBCUTANEOUS AS NEEDED
Status: DISCONTINUED | OUTPATIENT
Start: 2025-02-07 | End: 2025-02-07 | Stop reason: HOSPADM

## 2025-02-07 RX ORDER — SODIUM CHLORIDE, SODIUM LACTATE, POTASSIUM CHLORIDE, CALCIUM CHLORIDE 600; 310; 30; 20 MG/100ML; MG/100ML; MG/100ML; MG/100ML
INJECTION, SOLUTION INTRAVENOUS AS NEEDED
Status: DISCONTINUED | OUTPATIENT
Start: 2025-02-07 | End: 2025-02-07 | Stop reason: HOSPADM

## 2025-02-07 RX ORDER — IBUPROFEN 600 MG/1
600 TABLET, FILM COATED ORAL ONCE AS NEEDED
Status: DISCONTINUED | OUTPATIENT
Start: 2025-02-07 | End: 2025-02-07 | Stop reason: HOSPADM

## 2025-02-07 RX ORDER — ONDANSETRON 2 MG/ML
4 INJECTION INTRAMUSCULAR; INTRAVENOUS EVERY 6 HOURS PRN
Status: DISCONTINUED | OUTPATIENT
Start: 2025-02-07 | End: 2025-02-07 | Stop reason: HOSPADM

## 2025-02-07 RX ORDER — ACETAMINOPHEN 500 MG
1000 TABLET ORAL EVERY 6 HOURS PRN
Status: DISCONTINUED | OUTPATIENT
Start: 2025-02-07 | End: 2025-02-07 | Stop reason: HOSPADM

## 2025-02-07 RX ORDER — LIDOCAINE HYDROCHLORIDE 10 MG/ML
30 INJECTION, SOLUTION EPIDURAL; INFILTRATION; INTRACAUDAL; PERINEURAL ONCE
Status: DISCONTINUED | OUTPATIENT
Start: 2025-02-07 | End: 2025-02-07 | Stop reason: HOSPADM

## 2025-02-07 RX ADMIN — LIDOCAINE HYDROCHLORIDE AND EPINEPHRINE 3 ML: 15; 5 INJECTION, SOLUTION EPIDURAL at 20:35:00

## 2025-02-07 RX ADMIN — LIDOCAINE HYDROCHLORIDE 5 ML: 10 INJECTION, SOLUTION EPIDURAL; INFILTRATION; INTRACAUDAL; PERINEURAL at 20:27:00

## 2025-02-08 RX ORDER — AMMONIA 15 % (W/V)
0.3 AMPUL (EA) INHALATION AS NEEDED
Status: DISCONTINUED | OUTPATIENT
Start: 2025-02-08 | End: 2025-02-09

## 2025-02-08 RX ORDER — BISACODYL 10 MG
10 SUPPOSITORY, RECTAL RECTAL ONCE AS NEEDED
Status: DISCONTINUED | OUTPATIENT
Start: 2025-02-08 | End: 2025-02-09

## 2025-02-08 RX ORDER — SIMETHICONE 80 MG
80 TABLET,CHEWABLE ORAL 3 TIMES DAILY PRN
Status: DISCONTINUED | OUTPATIENT
Start: 2025-02-08 | End: 2025-02-09

## 2025-02-08 RX ORDER — ACETAMINOPHEN 500 MG
1000 TABLET ORAL EVERY 6 HOURS PRN
Status: DISCONTINUED | OUTPATIENT
Start: 2025-02-08 | End: 2025-02-09

## 2025-02-08 RX ORDER — IBUPROFEN 600 MG/1
600 TABLET, FILM COATED ORAL EVERY 6 HOURS
Status: DISCONTINUED | OUTPATIENT
Start: 2025-02-08 | End: 2025-02-09

## 2025-02-08 RX ORDER — ACETAMINOPHEN 500 MG
500 TABLET ORAL EVERY 6 HOURS PRN
Status: DISCONTINUED | OUTPATIENT
Start: 2025-02-08 | End: 2025-02-09

## 2025-02-08 RX ORDER — DOCUSATE SODIUM 100 MG/1
100 CAPSULE, LIQUID FILLED ORAL
Status: DISCONTINUED | OUTPATIENT
Start: 2025-02-08 | End: 2025-02-09

## 2025-02-08 NOTE — ANESTHESIA PREPROCEDURE EVALUATION
Anesthesia PreOp Note    HPI:     Nelly Li is a 34 year old female who presents for preoperative consultation requested by: * Surgery not found *    Date of Surgery:       Indication: * No surgery found *    Relevant Problems   No relevant active problems       NPO:                         History Review:  Patient Active Problem List    Diagnosis Date Noted    Pregnancy (HCC) 02/07/2025       Past Medical History:    Anemia    Kidney stones       History reviewed. No pertinent surgical history.    Prescriptions Prior to Admission[1]  Current Medications and Prescriptions Ordered in Epic[2]    Allergies[3]    History reviewed. No pertinent family history.  Social History     Socioeconomic History    Marital status:    Tobacco Use    Smoking status: Never    Smokeless tobacco: Never   Vaping Use    Vaping status: Never Used   Substance and Sexual Activity    Alcohol use: Never    Drug use: Never       Available pre-op labs reviewed.  Lab Results   Component Value Date    WBC 13.6 (H) 02/07/2025    RBC 4.78 02/07/2025    HGB 13.3 02/07/2025    HCT 40.3 02/07/2025    MCV 84.3 02/07/2025    MCH 27.8 02/07/2025    MCHC 33.0 02/07/2025    RDW 14.4 02/07/2025    .0 (H) 02/07/2025     Lab Results   Component Value Date     11/19/2024    K 4.0 11/19/2024     11/19/2024    CO2 22.0 11/19/2024    BUN 6 (L) 11/19/2024    CREATSERUM 0.49 (L) 11/19/2024    GLU 92 11/19/2024    CA 9.2 11/19/2024          Vital Signs:  Body mass index is 27.28 kg/m².   height is 1.6 m (5' 3\") and weight is 69.9 kg (154 lb). Her oral temperature is 98.3 °F (36.8 °C). Her respiration is 18.   Vitals:    02/07/25 1840 02/07/25 1845   Resp: 18    Temp: 98.3 °F (36.8 °C)    TempSrc: Oral    Weight:  69.9 kg (154 lb)   Height:  1.6 m (5' 3\")        Anesthesia Evaluation      Airway   Mallampati: I  TM distance: >3 FB  Neck ROM: full  Dental      Pulmonary - normal exam   Cardiovascular - normal exam    Neuro/Psych       GI/Hepatic/Renal      Endo/Other    (+) blood dyscrasia  Abdominal  - normal exam                 Anesthesia Plan:   ASA:  2  Emergent    Plan:   Epidural  Informed Consent Plan and Risks Discussed With:  Patient      I have informed Nelly Li and/or legal guardian or family member of the nature of the anesthetic plan, benefits, risks including possible dental damage if relevant, major complications, and any alternative forms of anesthetic management.   All of the patient's questions were answered to the best of my ability. The patient desires the anesthetic management as planned.  CURTIS CATES MD  2025 8:23 PM  Present on Admission:  **None**           [1]   Medications Prior to Admission   Medication Sig Dispense Refill Last Dose/Taking    ferrous sulfate 325 (65 FE) MG Oral Tab EC Take 1 tablet (325 mg total) by mouth daily with breakfast.   2025 Bedtime    Prenatal Vit-Fe Fumarate-FA (MULTI PRENATAL) 27-0.8 MG Oral Tab Take by mouth.   2025 Bedtime    [] cephALEXin 500 MG Oral Cap Take 1 capsule (500 mg total) by mouth 4 (four) times daily for 7 days. 28 capsule 0    [2]   Current Facility-Administered Medications Ordered in Epic   Medication Dose Route Frequency Provider Last Rate Last Admin    dextrose in lactated ringers 5% infusion   Intravenous Continuous Maria G Lim MD        lactated ringers infusion   Intravenous PRN Maria G Lim MD        lactated ringers IV bolus 500 mL  500 mL Intravenous PRN Maria G Lim MD        acetaminophen (Tylenol Extra Strength) tab 500 mg  500 mg Oral Q6H PRN Maria G Lim MD        acetaminophen (Tylenol Extra Strength) tab 1,000 mg  1,000 mg Oral Q6H PRN Maria G Lim MD        ibuprofen (Motrin) tab 600 mg  600 mg Oral Once PRN Maria G Lim MD        ondansetron (Zofran) 4 MG/2ML injection 4 mg  4 mg Intravenous Q6H PRN Maria G Lim MD        oxyTOCIN in sodium chloride 0.9% (Pitocin) 30 Units/500mL infusion premix   62.5-900 elana-units/min Intravenous Continuous Maria G Lim MD        terbutaline (Brethine) 1 MG/ML injection 0.25 mg  0.25 mg Subcutaneous PRN Maria G Lim MD        sodium citrate-citric acid (Bicitra) 500-334 MG/5ML oral solution 30 mL  30 mL Oral PRN Maria G Lim MD        lidocaine PF (Xylocaine-MPF) 1% injection  30 mL Intradermal Once Maria G Lim MD        lactated ringers IV bolus 1,000 mL  1,000 mL Intravenous Once Jorge Alberto Davis MD        fentaNYL-bupivacaine 2 mcg/mL-0.125% in sodium chloride 0.9% 100 mL EPIDURAL infusion premix   Epidural Continuous Jorge Alberto Davis MD        fentaNYL (Sublimaze) 50 mcg/mL injection 100 mcg  100 mcg Epidural Once Jorge Alberto Davis MD        bupivacaine PF (Marcaine) 0.25% injection  20 mL Epidural Once Jorge Alberto Davis MD        ePHEDrine (PF) 25 MG/5 ML injection 5 mg  5 mg Intravenous PRN Jorge Alberto Davis MD        nalbuphine (Nubain) 10 mg/mL injection 2.5 mg  2.5 mg Intravenous Q15 Min PRN Jorge Alberto Davis MD        fentaNYL (Sublimaze) 50 mcg/mL injection 50 mcg  50 mcg Intravenous Once Maria G Lim MD         No current [facility]-ordered outpatient medications on file.   [3] No Known Allergies

## 2025-02-08 NOTE — PROGRESS NOTES
Pt is a 34 year old female admitted to LDR1/LDR1-A.     Chief Complaint   Patient presents with    R/o Labor     Pt states contraction pain every 3-5 minutes since 5pm, Pt denies leaking of fluid states she noticed bloody mucous. Pt states she is not feeling baby moving.      Pt is  38w5d intra-uterine pregnancy.  History obtained, consents signed. Oriented to room, staff, and plan of care.

## 2025-02-08 NOTE — L&D DELIVERY NOTE
Jen, Girl [[id number]]      Labor Events     labor?: No   steroids?: None  Antibiotics received during labor?: No  Rupture date/time: 2025     Rupture type: AROM  Fluid color: Clear  Labor type: Spontaneous Onset of Labor  Augmentation: AROM       Labor Event Times    Labor onset date/time: 2025 1700  Dilation complete date/time: 2025  Start pushing date/time: 2025       Drifting Presentation    Presentation: Vertex       Operative Delivery    Operative Vaginal Delivery: N/A                Shoulder Dystocia    Shoulder Dystocia: N/A       Anesthesia    Method: Epidural               Delivery      Head delivery date/time: 2025 20:53:50   Delivery date/time:  25 20:54:00       Details:            Delivery Providers       Delivery personnel:  Provider Role    Baby Nurse    Delivery Nurse             Cord    No data filed       Resuscitation    Method: None       Drifting Measurements    No data filed       Placenta    Date/time: 2025  Removal: Spontaneous  Appearance: Intact  Disposition: Discarded       Apgars    No data filed       Skin to Skin    Skin to skin initiated date/time: 2025  Skin to skin with: Mother       Vaginal Count    Initial count RN: Brian Gallagher RN  Initial count Tech: VolobNelda vanegas   Sponges   Sharps    Initial counts 10   0    Final counts               Lacerations    Episiotomy: None  Perineal lacerations: None      Labial laceration: left Repaired?: Yes     Vaginal laceration?: No      Cervical laceration?: No    Clitoral laceration?: No    Quantitative blood loss (mL): 350            [facility]  part of [facility]    Vaginal Delivery Note    Nelly Li Patient Status:  Inpatient    1990 MRN [id number]   Location [facility]TH [facility] Attending Maria G Lim MD   Hosp Day # 0 PCP No primary care provider on file.     Delivery     Infant  Date of  Delivery: 2025   Time of Delivery: 8:54 PM  Delivery Type:      Infant Sex/Birthweight: female No birth weight on file.    Presentation Vertex [1]  Position          Apgars:  1 minute:                 5 minutes:                          10 minutes:      Placenta  Date/Time of Delivery: 2025  8:58 PM   Delivery: spontaneous  Placenta to Pathology: no  Cord Gases Submitted: no  Cord Blood Collection: yes  Cord Tissue Collection: yes  Cord Complications: none  Sponge and Needle Counts:  Verified yes    Maternal Anesthesia: epidural   Episiotomy/Laceration Repair  Episiotomy: none  Laceration: Left Labial  Location: left  Suture Size/Type: 3-0 Chromic  Anesthesia: 1% lidocaine    Delivery Complications  none    Neonatologist Present: no  Delivery Comment: Patient complete and pushing in LDR with epidural.  Delivered DYLLAN head over intact perineum.  No nuchal cord.  Body delivered and placed on maternal abdomen.  Cord clamped x2 and cut by father. Cord blood collected.   of intact 3v cord and placenta.  Laceration repaired in routine manner.  Cervix and rectum intact.        Intake/Output   Quantitative Blood Loss (mL) 350      Maria G Lim MD   2025  9:22 PM

## 2025-02-08 NOTE — H&P
[facility]  part of [facility]    History & Physical    Nelly Li Patient Status:  Inpatient    1990 MRN [id number]   Location [facility] Attending Maria G Lim MD   Hosp Day # 0 PCP No primary care provider on file.     Date of Admission:  2025    SUBJECTIVE:    Nelly Li is a 34 year old  female with 2025, Date entered prior to episode creation   at 38w5d gestation who is being admitted for labor management.  Her current obstetrical history is significant for  hx IUGR last pregnancy .  Patient reports contractions since this evening  .   Fetal Movement: normal.     Obstetric History:   OB History    Para Term  AB Living   2 1 1     1   SAB IAB Ectopic Multiple Live Births           1      # Outcome Date GA Lbr Daniel/2nd Weight Sex Type Anes PTL Lv   2 Current            1 Term 23     NORMAL SPONT   MAC     Past Medical History:   Past Medical History:    Anemia    Kidney stones     Past Social History: History reviewed. No pertinent surgical history.  Family History: History reviewed. No pertinent family history.  Social History:   Social History     Tobacco Use    Smoking status: Never    Smokeless tobacco: Never   Substance Use Topics    Alcohol use: Never       Home Meds: Prescriptions Prior to Admission[1]  Allergies: Allergies[2]    OBJECTIVE:    Temp:  [98.3 °F (36.8 °C)] 98.3 °F (36.8 °C)  Resp:  [18] 18    Lungs:   clear to auscultation bilaterally   Heart:   regular rate and rhythm, S1, S2 normal, no murmur, click, rub or gallop   Abdomen: FHT present   Fetal Surveillance:  130 BPM   Fetal heart variability: moderate  Fetal Heart Rate accelerations: yes  Uterine contractions: regular, every 2-4 minutes      Cervix: 5-6/80/-2     Lab Review:  A, Rh+, Rubella-immune, Hepatitis B surface antigen non-reactive, GBS negative  Lab Results   Component Value Date    WBC 13.6 2025    HGB 13.3 2025     HCT 40.3 2025    .0 2025          ASSESSMENT:    38w5d weeks gestation.  Active phase labor.  Obstetrical history significant for  previous pregnancy with IUGR .    PLAN:    Risks, benefits, alternatives and possible complications have been discussed in detail with the patient.  Pre-admission, admission, and post admission procedures and expectations were discussed in detail.  All questions answered, all appropriate consents will be signed at the Hospital. Admission is planned for today.   Expectant management. and Anticipate vaginal delivery..    Maria G Lim MD  2025  7:41 PM         [1]   Medications Prior to Admission   Medication Sig Dispense Refill Last Dose/Taking    ferrous sulfate 325 (65 FE) MG Oral Tab EC Take 1 tablet (325 mg total) by mouth daily with breakfast.   2025 Bedtime    Prenatal Vit-Fe Fumarate-FA (MULTI PRENATAL) 27-0.8 MG Oral Tab Take by mouth.   2025 Bedtime    [] cephALEXin 500 MG Oral Cap Take 1 capsule (500 mg total) by mouth 4 (four) times daily for 7 days. 28 capsule 0    [2] No Known Allergies

## 2025-02-08 NOTE — ANESTHESIA POSTPROCEDURE EVALUATION
Patient: Nelly Li    Procedure Summary       Date: 02/07/25 Room / Location:     Anesthesia Start: 2025 Anesthesia Stop: 2058    Procedure: LABOR ANALGESIA Diagnosis:     Scheduled Providers:  Anesthesiologist: Jorge Alberto Davis MD    Anesthesia Type: epidural ASA Status: 2 - Emergent            Anesthesia Type: epidural    Vitals Value Taken Time   /81 02/07/25 2145   Temp 98.3 °F (36.8 °C) 02/07/25 2115   Pulse 100 02/07/25 2155   Resp 18 02/07/25 2115   SpO2 98 % 02/07/25 2155   Vitals shown include unfiled device data.    [facility] AN Post Evaluation:   Patient Evaluated in PACU  Patient Participation: complete - patient participated  Level of Consciousness: awake and alert  Pain Management: adequate  Airway Patency:patent  Dental exam unchanged from preop  Yes    Cardiovascular Status: acceptable  Respiratory Status: acceptable  Postoperative Hydration acceptable      JORGE ALBERTO DAVIS MD  2/8/2025 6:08 AM

## 2025-02-08 NOTE — PROGRESS NOTES
[facility]  part of [facility]    OB/Gyne Postpartum Progress Note      Nelly Li Patient Status:  Inpatient    1990 MRN [id number]   Location [facility] 3SE Attending Maria G Lim MD   Hosp Day # 1 PCP No primary care provider on file.       Subjective      Patient feeling well.   Pain well controlled. Lochia appropriate.   Tolerating diet. Denies N/V  Ambulating. Spontaneously voiding. Passing flatus   Breastfeeding    Objective   Vital signs in last 24 hours:  Temp:  [97.9 °F (36.6 °C)-98.3 °F (36.8 °C)] 98.3 °F (36.8 °C)  Pulse:  [] 91  Resp:  [16-18] 16  BP: (111-163)/() 121/80  SpO2:  [97 %-99 %] 98 %    Input/Output:    Intake/Output Summary (Last 24 hours) at 2025 1430  Last data filed at 2025 0000  Gross per 24 hour   Intake 780.22 ml   Output 1250 ml   Net -469.78 ml         Constitutional: comfortable  Pulm: non labored breathing  CV: regular rate  Abdomen: soft, nontender, nondistended  Uterus: fundus firm at umbilicus,   Extremities: No calf tenderness      Results:   Labs / Path / Radiology:    Recent Results (from the past 24 hours)   CBC With Differential With Platelet    Collection Time: 25  7:31 PM   Result Value Ref Range    WBC 13.6 (H) 4.0 - 11.0 x10(3) uL    RBC 4.78 3.80 - 5.30 x10(6)uL    HGB 13.3 12.0 - 16.0 g/dL    HCT 40.3 35.0 - 48.0 %    MCV 84.3 80.0 - 100.0 fL    MCH 27.8 26.0 - 34.0 pg    MCHC 33.0 31.0 - 37.0 g/dL    RDW-SD 43.8 35.1 - 46.3 fL    RDW 14.4 11.0 - 15.0 %    .0 (H) 150.0 - 450.0 10(3)uL    Neutrophil Absolute Prelim 9.60 (H) 1.50 - 7.70 x10 (3) uL    Neutrophil Absolute 9.60 (H) 1.50 - 7.70 x10(3) uL    Lymphocyte Absolute 2.96 1.00 - 4.00 x10(3) uL    Monocyte Absolute 0.74 0.10 - 1.00 x10(3) uL    Eosinophil Absolute 0.02 0.00 - 0.70 x10(3) uL    Basophil Absolute 0.07 0.00 - 0.20 x10(3) uL    Immature Granulocyte Absolute 0.19 0.00 - 1.00 x10(3) uL    Neutrophil % 70.8 %    Lymphocyte %  21.8 %    Monocyte % 5.4 %    Eosinophil % 0.1 %    Basophil % 0.5 %    Immature Granulocyte % 1.4 %   T Pallidum Screening Cascade    Collection Time: 25  7:31 PM   Result Value Ref Range    Treponemal Antibodies Nonreactive Nonreactive    ABORH (Blood Type)    Collection Time: 25  7:32 PM   Result Value Ref Range    ABO BLOOD TYPE A     RH BLOOD TYPE Positive    Antibody Screen    Collection Time: 25  7:32 PM   Result Value Ref Range    Antibody Screen Negative    ABORH Confirmation    Collection Time: 25  7:45 PM   Result Value Ref Range    ABO BLOOD TYPE A     RH BLOOD TYPE Positive        Recent Labs   Lab 25  1931   RBC 4.78   HGB 13.3   HCT 40.3   MCV 84.3   MCH 27.8   MCHC 33.0   RDW 14.4   NEPRELIM 9.60*   WBC 13.6*   .0*             Assessment/Plan   34 year oldyo  , s/p , postpartum day 1.      Postpartum:  -General diet, ambulating  -Voiding freely   -Meeting milestones  Continue postpartum care         Keila Uribe MD  2025  2:30 PM

## 2025-02-08 NOTE — PROGRESS NOTES
Patient up to bathroom with assist x 2.  Voided 400 Patient transferred to mother/baby room 357 per wheelchair in stable condition with baby and personal belongings.  Accompanied by significant other and staff.  Report given to Chas mother/baby RN.

## 2025-02-08 NOTE — ANESTHESIA PROCEDURE NOTES
Labor Analgesia    Date/Time: 2/7/2025 8:25 PM    Performed by: Jorge Alberto Davis MD  Authorized by: Jorge Alberto Davis MD      General Information and Staff    Start Time:  2/7/2025 8:25 PM  End Time:  2/7/2025 8:39 PM  Anesthesiologist:  Jorge Alberto Davis MD  Performed by:  Anesthesiologist  Patient Location:  OB  Site Identification: surface landmarks    Reason for Block: labor epidural    Preanesthetic Checklist: patient identified, IV checked, site marked, risks and benefits discussed, monitors and equipment checked, pre-op evaluation, timeout performed, anesthesia consent and sterile technique used      Procedure Details    Patient Position:  Sitting  Prep: ChloraPrep    Monitoring:  Heart rate  Approach:  Midline    Epidural Needle    Injection Technique:  SIMONE saline  Needle Type:  Tuohy  Needle Gauge:  18 G  Needle Length:  3.5 in  Needle Insertion Depth:  6  Location:  L2-3    Spinal Needle      Catheter    Catheter Type:  Multi-orifice  Catheter Size:  20 G  Catheter at Skin Depth:  12  Test Dose:  Negative    Assessment    Sensory Level:  T10    Additional Comments

## 2025-02-09 VITALS
RESPIRATION RATE: 16 BRPM | HEIGHT: 63 IN | TEMPERATURE: 98 F | OXYGEN SATURATION: 98 % | BODY MASS INDEX: 27.29 KG/M2 | SYSTOLIC BLOOD PRESSURE: 104 MMHG | HEART RATE: 90 BPM | WEIGHT: 154 LBS | DIASTOLIC BLOOD PRESSURE: 72 MMHG

## 2025-02-09 PROBLEM — Z34.90 PREGNANCY (HCC): Status: RESOLVED | Noted: 2025-02-07 | Resolved: 2025-02-09

## 2025-02-09 LAB
BASOPHILS # BLD AUTO: 0.08 X10(3) UL (ref 0–0.2)
BASOPHILS NFR BLD AUTO: 0.7 %
DEPRECATED RDW RBC AUTO: 43.9 FL (ref 35.1–46.3)
EOSINOPHIL # BLD AUTO: 0.08 X10(3) UL (ref 0–0.7)
EOSINOPHIL NFR BLD AUTO: 0.7 %
ERYTHROCYTE [DISTWIDTH] IN BLOOD BY AUTOMATED COUNT: 14.2 % (ref 11–15)
HCT VFR BLD AUTO: 33.1 %
HGB BLD-MCNC: 11.4 G/DL
IMM GRANULOCYTES # BLD AUTO: 0.14 X10(3) UL (ref 0–1)
IMM GRANULOCYTES NFR BLD: 1.2 %
LYMPHOCYTES # BLD AUTO: 3.05 X10(3) UL (ref 1–4)
LYMPHOCYTES NFR BLD AUTO: 25.1 %
MCH RBC QN AUTO: 29 PG (ref 26–34)
MCHC RBC AUTO-ENTMCNC: 34.4 G/DL (ref 31–37)
MCV RBC AUTO: 84.2 FL
MONOCYTES # BLD AUTO: 0.63 X10(3) UL (ref 0.1–1)
MONOCYTES NFR BLD AUTO: 5.2 %
NEUTROPHILS # BLD AUTO: 8.17 X10 (3) UL (ref 1.5–7.7)
NEUTROPHILS # BLD AUTO: 8.17 X10(3) UL (ref 1.5–7.7)
NEUTROPHILS NFR BLD AUTO: 67.1 %
PLATELET # BLD AUTO: 402 10(3)UL (ref 150–450)
RBC # BLD AUTO: 3.93 X10(6)UL
WBC # BLD AUTO: 12.2 X10(3) UL (ref 4–11)

## 2025-02-09 PROCEDURE — 85025 COMPLETE CBC W/AUTO DIFF WBC: CPT | Performed by: OBSTETRICS & GYNECOLOGY

## 2025-02-09 RX ORDER — IBUPROFEN 200 MG
600 TABLET ORAL EVERY 6 HOURS PRN
Qty: 20 TABLET | Refills: 0 | Status: SHIPPED | COMMUNITY
Start: 2025-02-09

## 2025-02-09 RX ORDER — ACETAMINOPHEN 500 MG
500 TABLET ORAL EVERY 6 HOURS PRN
Qty: 20 TABLET | Refills: 0 | Status: SHIPPED | COMMUNITY
Start: 2025-02-09

## 2025-02-09 NOTE — PROGRESS NOTES
[facility]  part of [facility]    OB/Gyne Postpartum Progress Note      Nelly Li Patient Status:  Inpatient    1990 MRN [id number]   Location [facility] 3SE Attending Maria G Lim MD   Hosp Day # 2 PCP No primary care provider on file.       Subjective      Patient feeling well.   Pain well controlled. Lochia appropriate.   Tolerating diet. Denies N/V  Ambulating. Spontaneously voiding. Passing flatus   Breastfeeding    Objective   Vital signs in last 24 hours:  Temp:  [98.2 °F (36.8 °C)-99 °F (37.2 °C)] 98.2 °F (36.8 °C)  Pulse:  [82-90] 90  Resp:  [15-16] 16  BP: (104-118)/(72-88) 104/72    Input/Output:    Intake/Output Summary (Last 24 hours) at 2025 1141  Last data filed at 2025  Gross per 24 hour   Intake 500 ml   Output --   Net 500 ml         Constitutional: comfortable  Pulm: non labored breathing  CV: regular rate  Abdomen: soft, nontender, nondistended  Uterus: fundus firm at umbilicus,   Extremities: No calf tenderness      Results:   Labs / Path / Radiology:    Recent Results (from the past 24 hours)   CBC With Differential With Platelet    Collection Time: 25  6:22 AM   Result Value Ref Range    WBC 12.2 (H) 4.0 - 11.0 x10(3) uL    RBC 3.93 3.80 - 5.30 x10(6)uL    HGB 11.4 (L) 12.0 - 16.0 g/dL    HCT 33.1 (L) 35.0 - 48.0 %    MCV 84.2 80.0 - 100.0 fL    MCH 29.0 26.0 - 34.0 pg    MCHC 34.4 31.0 - 37.0 g/dL    RDW-SD 43.9 35.1 - 46.3 fL    RDW 14.2 11.0 - 15.0 %    .0 150.0 - 450.0 10(3)uL    Neutrophil Absolute Prelim 8.17 (H) 1.50 - 7.70 x10 (3) uL    Neutrophil Absolute 8.17 (H) 1.50 - 7.70 x10(3) uL    Lymphocyte Absolute 3.05 1.00 - 4.00 x10(3) uL    Monocyte Absolute 0.63 0.10 - 1.00 x10(3) uL    Eosinophil Absolute 0.08 0.00 - 0.70 x10(3) uL    Basophil Absolute 0.08 0.00 - 0.20 x10(3) uL    Immature Granulocyte Absolute 0.14 0.00 - 1.00 x10(3) uL    Neutrophil % 67.1 %    Lymphocyte % 25.1 %    Monocyte % 5.2 %     Eosinophil % 0.7 %    Basophil % 0.7 %    Immature Granulocyte % 1.2 %       Recent Labs   Lab 25  1931 25  0622   RBC 4.78 3.93   HGB 13.3 11.4*   HCT 40.3 33.1*   MCV 84.3 84.2   MCH 27.8 29.0   MCHC 33.0 34.4   RDW 14.4 14.2   NEPRELIM 9.60* 8.17*   WBC 13.6* 12.2*   .0* 402.0             Assessment/Plan   34 year oldyo  , s/p , postpartum day 2.      Postpartum:  -General diet, ambulating  -Voiding freely   -Meeting milestones    Discharge home today  Discussed restrictions, instructions, medications  F/U in office in 6 weeks       Keila Uribe MD  2025  11:41 AM

## 2025-02-09 NOTE — DISCHARGE INSTRUCTIONS
What to Expect:  Abdominal cramping after delivery especially if you are breastfeeding.   Vaginal bleeding for about 4-6 weeks that may be followed by a yellow or white discharge for a few more weeks.  Your period will resume in approximately 6-8 weeks, unless you are breastfeeding.    If you are bottle feeding, you may notice breast engorgement in about 3 days.  Your breast may be sore and hard. Please wear a tight fitted bra or sports bra for 24-36 hours to help prevent your breast from producing milk, and use ice packs to relive any discomfort.  If you are breastfeeding, nipple dryness is very common the first few days.    Constipation is common after having a baby.  Please increase fluid and fiber in your diet.       Over-The-Counter Medication  Non-prescription anti-inflammatory medications can also help to ease the pain.  You may take Aleve, Tylenol or Ibuprofen   Colace or Metamucil for Constipation  Lanolin for dry nipples  Tucks, Witch Hazel and Epifoam for vaginal/perineum discomfort.   Drink a full glass of water with oral medication and take as directed.     Bathing/Showers  You may resume showers  No baths, swimming, hot tubs until your post-partum visit     Home Medication  Resume your home medications as instructed     Diet  Resume your normal diet     Activity  Refrain from vaginal intercourse, vaginal suppositories, tampon use or douches until after your post-partum visit.  No exercising for 4 weeks  You may climb stairs minimally for the 1st week.    Do not do heavy housework for at least 2-3 weeks     Return to Work or School  You may return to work in approximately 6 weeks  Contact your obstetrician’s office, if you need a medical release.      Driving  Avoid driving if you are taking narcotics for pain relief.     Follow-up Appointment with Your Obstetrician  Call your obstetrician’s office today for an appointment in 6 weeks.    Verify your appointment date, day, time, and location.  At your  1st post-partum office visit:  Your progress will be evaluated, findings reviewed, and any additional concerns and instructions will be discussed.     Questions or Concerns  Call your obstetrician’s office if you experience the following:  Severe pain not controlled by pain medication  Foul smelling vaginal discharge  Heavy bleeding  Shortness of breath  Fever  Crying and periods of sadness that prevents you from caring for yourself and your baby  Burning sensation during urination or inability to urinate  Swelling, redness or abnormal warmth to your leg/calf  If your call is made after office hours, a physician will be available to help you.  There is always a provider covering our patients.

## 2025-02-09 NOTE — CM/SW NOTE
SDOH received for housing. CM discussed with patient. Patient confirmed she lives in a town home with her spouse and other kids. Patient denies any concern for housing.     Stefany Wright, RN, BSN      
pt able to actively bend L knee to 10 degs/Right LE Active ROM was WFL (within functional limits)

## 2025-02-09 NOTE — DISCHARGE SUMMARY
[facility]  part of [facility]    Obstetrical Discharge Summary    Nelly Li Patient Status:  Inpatient    1990 MRN [id number]   Location [facility] 3SE Attending Maria G Lim MD   Hosp Day # 2 PCP No primary care provider on file.     Date of Admission: 2025     Date of Discharge: 2025    Admitting Diagnosis: pregnancy  Pregnancy (HCC)    Discharge Diagnosis: .Active Problems:    * No active hospital problems. *      Disposition: home    Hospital Course:   Reason for Admission:  Nelly Li is a 34 year old  who was admitted with Estimated Date of Delivery: 25. She presented for labor management.        Hospital Course: Pt underwent a spontaneous vaginal. Refer to delivery note/ operative report for details.  Pt was transferred to mother/ baby.  She underwent an uncomplicated postpartum course.  Pt is being discharged today.    Date of Delivery: 2025   Time of Delivery: 8:54 PM  Delivery Type: Normal spontaneous vaginal delivery    Live   Information for the patient's :  Jen Girl [[id number]]   female infant   Information for the patient's :  Estrella Li [[id number]]   6 lb 14.1 oz (3.12 kg)   Apgars:  1 minute: 9               5 minutes: 9                        10 minutes:        Postpartum Course: Her postpartum course was uncomplicated     Results:   Recent Results (from the past 2 weeks)   CBC With Differential With Platelet    Collection Time: 25  7:31 PM   Result Value Ref Range    WBC 13.6 (H) 4.0 - 11.0 x10(3) uL    RBC 4.78 3.80 - 5.30 x10(6)uL    HGB 13.3 12.0 - 16.0 g/dL    HCT 40.3 35.0 - 48.0 %    MCV 84.3 80.0 - 100.0 fL    MCH 27.8 26.0 - 34.0 pg    MCHC 33.0 31.0 - 37.0 g/dL    RDW-SD 43.8 35.1 - 46.3 fL    RDW 14.4 11.0 - 15.0 %    .0 (H) 150.0 - 450.0 10(3)uL    Neutrophil Absolute Prelim 9.60 (H) 1.50 - 7.70 x10 (3) uL    Neutrophil Absolute 9.60 (H) 1.50 - 7.70 x10(3) uL     Lymphocyte Absolute 2.96 1.00 - 4.00 x10(3) uL    Monocyte Absolute 0.74 0.10 - 1.00 x10(3) uL    Eosinophil Absolute 0.02 0.00 - 0.70 x10(3) uL    Basophil Absolute 0.07 0.00 - 0.20 x10(3) uL    Immature Granulocyte Absolute 0.19 0.00 - 1.00 x10(3) uL    Neutrophil % 70.8 %    Lymphocyte % 21.8 %    Monocyte % 5.4 %    Eosinophil % 0.1 %    Basophil % 0.5 %    Immature Granulocyte % 1.4 %   T Pallidum Screening Cascade    Collection Time: 02/07/25  7:31 PM   Result Value Ref Range    Treponemal Antibodies Nonreactive Nonreactive    ABORH (Blood Type)    Collection Time: 02/07/25  7:32 PM   Result Value Ref Range    ABO BLOOD TYPE A     RH BLOOD TYPE Positive    Antibody Screen    Collection Time: 02/07/25  7:32 PM   Result Value Ref Range    Antibody Screen Negative    ABORH Confirmation    Collection Time: 02/07/25  7:45 PM   Result Value Ref Range    ABO BLOOD TYPE A     RH BLOOD TYPE Positive    CBC With Differential With Platelet    Collection Time: 02/09/25  6:22 AM   Result Value Ref Range    WBC 12.2 (H) 4.0 - 11.0 x10(3) uL    RBC 3.93 3.80 - 5.30 x10(6)uL    HGB 11.4 (L) 12.0 - 16.0 g/dL    HCT 33.1 (L) 35.0 - 48.0 %    MCV 84.2 80.0 - 100.0 fL    MCH 29.0 26.0 - 34.0 pg    MCHC 34.4 31.0 - 37.0 g/dL    RDW-SD 43.9 35.1 - 46.3 fL    RDW 14.2 11.0 - 15.0 %    .0 150.0 - 450.0 10(3)uL    Neutrophil Absolute Prelim 8.17 (H) 1.50 - 7.70 x10 (3) uL    Neutrophil Absolute 8.17 (H) 1.50 - 7.70 x10(3) uL    Lymphocyte Absolute 3.05 1.00 - 4.00 x10(3) uL    Monocyte Absolute 0.63 0.10 - 1.00 x10(3) uL    Eosinophil Absolute 0.08 0.00 - 0.70 x10(3) uL    Basophil Absolute 0.08 0.00 - 0.20 x10(3) uL    Immature Granulocyte Absolute 0.14 0.00 - 1.00 x10(3) uL    Neutrophil % 67.1 %    Lymphocyte % 25.1 %    Monocyte % 5.2 %    Eosinophil % 0.7 %    Basophil % 0.7 %    Immature Granulocyte % 1.2 %     No results for input(s): \"ABORH\" in the last 72 hours.  Recent Labs     02/07/25  1931 02/09/25  0622   WBC  13.6* 12.2*   HGB 13.3 11.4*   HCT 40.3 33.1*     No results found.    Complications: none       Discharge Plan:   Discharge Condition: stable     Discharge Meds:   Current Discharge Medication List        New Orders    Details   acetaminophen 500 MG Oral Tab Take 1 tablet (500 mg total) by mouth every 6 (six) hours as needed for Pain.      ibuprofen 200 MG Oral Tab Take 3 tablets (600 mg total) by mouth every 6 (six) hours as needed for Pain.           Home Meds - Unchanged    Details   Prenatal Vit-Fe Fumarate-FA (MULTI PRENATAL) 27-0.8 MG Oral Tab Take by mouth.                   Discharge Activity: Pelvic rest until cleared  No heavy lifting >15 lbs  Abstain from sexual intercourse until cleared at 6 wks PP  No driving the first week and when on opiates  Activity as tolerated.    No exercise until cleared at 6 wks PP    Follow up:     Follow up in office: 6 weeks        Keila Uribe MD  2/9/2025

## 2025-03-08 ENCOUNTER — TELEPHONE (OUTPATIENT)
Dept: OBGYN UNIT | Facility: HOSPITAL | Age: 35
End: 2025-03-08

## (undated) NOTE — LETTER
[facility]  Administration of Anesthesia  INelly agree to be cared for by a physician anesthesiologist alone and/or with a nurse anesthetist, who is specially trained to monitor me and give me medicine to put me to sleep or keep me comfortable during my procedure    I understand that my anesthesiologist and/or anesthetist is not an employee or agent of [facility] or [employer]. He or she works for [facility]    As the patient asking for anesthesia services, I agree to:  Allow the anesthesiologist (anesthesia doctor) to give me medicine and do additional procedures as necessary. Some examples are: Starting or using an “IV” to give me medicine, fluids or blood during my procedure, and having a breathing tube placed to help me breathe when I’m asleep (intubation). In the event that my heart stops working properly, I understand that my anesthesiologist will make every effort to sustain my life, unless otherwise directed by [facility] Do Not Resuscitate documents.  Tell my anesthesia doctor before my procedure:  If I am pregnant.  The last time that I ate or drank.  iii. All of the medicines I take (including prescriptions, herbal supplements, and pills I can buy without a prescription (including street drugs/illegal medications). Failure to inform my anesthesiologist about these medicines may increase my risk of anesthetic complications.  iv.If I am allergic to anything or have had a reaction to anesthesia before.  I understand how the anesthesia medicine will help me (benefits).  I understand that with any type of anesthesia medicine there are risks:  The most common risks are: nausea, vomiting, sore throat, muscle soreness, damage to my eyes, mouth, or teeth (from breathing tube placement).  Rare risks include: remembering what happened during my procedure, allergic reactions to medications, injury to my airway, heart, lungs, vision, nerves, or  muscles and in extremely rare instances death.  My doctor has explained to me other choices available to me for my care (alternatives).  Pregnant Patients (“epidural”):  I understand that the risks of having an epidural (medicine given into my back to help control pain during labor), include itching, low blood pressure, difficulty urinating, headache or slowing of the baby’s heart. Very rare risks include infection, bleeding, seizure, irregular heart rhythms and nerve injury.  Regional Anesthesia (“spinal”, “epidural”, & “nerve blocks”):  I understand that rare but potential complications include headache, bleeding, infection, seizure, irregular heart rhythms, and nerve injury.    _____________________________________________________________________________  Patient (or Representative) Signature/Relationship to Patient  Date   Time    _____________________________________________________________________________   Name (if used)    Language/Organization   Time    _____________________________________________________________________________  Nurse Anesthetist Signature     Date   Time  _____________________________________________________________________________  Anesthesiologist Signature     Date   Time  I have discussed the procedure and information above with the patient (or patient’s representative) and answered their questions. The patient or their representative has agreed to have anesthesia services.    _____________________________________________________________________________  Witness        Date   Time  I have verified that the signature is that of the patient or patient’s representative, and that it was signed before the procedure  Patient Name: Nelly Li     : 1990                 Printed: 2025 at 6:41 PM    Medical Record #: [id number]                                            Page 1 of 1  ----------ANESTHESIA CONSENT----------